# Patient Record
Sex: FEMALE | Race: ASIAN | NOT HISPANIC OR LATINO
[De-identification: names, ages, dates, MRNs, and addresses within clinical notes are randomized per-mention and may not be internally consistent; named-entity substitution may affect disease eponyms.]

---

## 2022-01-17 ENCOUNTER — TRANSCRIPTION ENCOUNTER (OUTPATIENT)
Age: 27
End: 2022-01-17

## 2022-10-31 PROBLEM — Z00.00 ENCOUNTER FOR PREVENTIVE HEALTH EXAMINATION: Status: ACTIVE | Noted: 2022-10-31

## 2022-11-03 ENCOUNTER — APPOINTMENT (OUTPATIENT)
Dept: CT IMAGING | Facility: CLINIC | Age: 27
End: 2022-11-03

## 2022-11-15 ENCOUNTER — APPOINTMENT (OUTPATIENT)
Dept: CT IMAGING | Facility: IMAGING CENTER | Age: 27
End: 2022-11-15

## 2023-12-12 ENCOUNTER — INPATIENT (INPATIENT)
Facility: HOSPITAL | Age: 28
LOS: 2 days | Discharge: HOME | End: 2023-12-15
Attending: OBSTETRICS & GYNECOLOGY | Admitting: OBSTETRICS & GYNECOLOGY

## 2023-12-12 VITALS
DIASTOLIC BLOOD PRESSURE: 75 MMHG | RESPIRATION RATE: 16 BRPM | HEART RATE: 91 BPM | SYSTOLIC BLOOD PRESSURE: 138 MMHG | TEMPERATURE: 98 F

## 2023-12-12 DIAGNOSIS — O26.899 OTHER SPECIFIED PREGNANCY RELATED CONDITIONS, UNSPECIFIED TRIMESTER: ICD-10-CM

## 2023-12-12 DIAGNOSIS — O42.90 PREMATURE RUPTURE OF MEMBRANES, UNSPECIFIED AS TO LENGTH OF TIME BETWEEN RUPTURE AND ONSET OF LABOR, UNSPECIFIED WEEKS OF GESTATION: ICD-10-CM

## 2023-12-12 LAB
ALBUMIN SERPL ELPH-MCNC: 3.5 G/DL — SIGNIFICANT CHANGE UP (ref 3.3–5)
ALBUMIN SERPL ELPH-MCNC: 3.5 G/DL — SIGNIFICANT CHANGE UP (ref 3.3–5)
ALP SERPL-CCNC: 119 U/L — SIGNIFICANT CHANGE UP (ref 40–120)
ALP SERPL-CCNC: 119 U/L — SIGNIFICANT CHANGE UP (ref 40–120)
ALT FLD-CCNC: 14 U/L — SIGNIFICANT CHANGE UP (ref 4–33)
ALT FLD-CCNC: 14 U/L — SIGNIFICANT CHANGE UP (ref 4–33)
ANION GAP SERPL CALC-SCNC: 11 MMOL/L — SIGNIFICANT CHANGE UP (ref 7–14)
ANION GAP SERPL CALC-SCNC: 11 MMOL/L — SIGNIFICANT CHANGE UP (ref 7–14)
APPEARANCE UR: CLEAR — SIGNIFICANT CHANGE UP
APPEARANCE UR: CLEAR — SIGNIFICANT CHANGE UP
APTT BLD: 26.1 SEC — SIGNIFICANT CHANGE UP (ref 24.5–35.6)
APTT BLD: 26.1 SEC — SIGNIFICANT CHANGE UP (ref 24.5–35.6)
AST SERPL-CCNC: 16 U/L — SIGNIFICANT CHANGE UP (ref 4–32)
AST SERPL-CCNC: 16 U/L — SIGNIFICANT CHANGE UP (ref 4–32)
BACTERIA # UR AUTO: NEGATIVE /HPF — SIGNIFICANT CHANGE UP
BACTERIA # UR AUTO: NEGATIVE /HPF — SIGNIFICANT CHANGE UP
BASOPHILS # BLD AUTO: 0.03 K/UL — SIGNIFICANT CHANGE UP (ref 0–0.2)
BASOPHILS # BLD AUTO: 0.03 K/UL — SIGNIFICANT CHANGE UP (ref 0–0.2)
BASOPHILS NFR BLD AUTO: 0.2 % — SIGNIFICANT CHANGE UP (ref 0–2)
BASOPHILS NFR BLD AUTO: 0.2 % — SIGNIFICANT CHANGE UP (ref 0–2)
BILIRUB SERPL-MCNC: 0.3 MG/DL — SIGNIFICANT CHANGE UP (ref 0.2–1.2)
BILIRUB SERPL-MCNC: 0.3 MG/DL — SIGNIFICANT CHANGE UP (ref 0.2–1.2)
BILIRUB UR-MCNC: NEGATIVE — SIGNIFICANT CHANGE UP
BILIRUB UR-MCNC: NEGATIVE — SIGNIFICANT CHANGE UP
BLD GP AB SCN SERPL QL: NEGATIVE — SIGNIFICANT CHANGE UP
BLD GP AB SCN SERPL QL: NEGATIVE — SIGNIFICANT CHANGE UP
BUN SERPL-MCNC: 10 MG/DL — SIGNIFICANT CHANGE UP (ref 7–23)
BUN SERPL-MCNC: 10 MG/DL — SIGNIFICANT CHANGE UP (ref 7–23)
CALCIUM SERPL-MCNC: 9.6 MG/DL — SIGNIFICANT CHANGE UP (ref 8.4–10.5)
CALCIUM SERPL-MCNC: 9.6 MG/DL — SIGNIFICANT CHANGE UP (ref 8.4–10.5)
CAST: 1 /LPF — SIGNIFICANT CHANGE UP (ref 0–4)
CAST: 1 /LPF — SIGNIFICANT CHANGE UP (ref 0–4)
CHLORIDE SERPL-SCNC: 104 MMOL/L — SIGNIFICANT CHANGE UP (ref 98–107)
CHLORIDE SERPL-SCNC: 104 MMOL/L — SIGNIFICANT CHANGE UP (ref 98–107)
CO2 SERPL-SCNC: 24 MMOL/L — SIGNIFICANT CHANGE UP (ref 22–31)
CO2 SERPL-SCNC: 24 MMOL/L — SIGNIFICANT CHANGE UP (ref 22–31)
COLOR SPEC: YELLOW — SIGNIFICANT CHANGE UP
COLOR SPEC: YELLOW — SIGNIFICANT CHANGE UP
CREAT ?TM UR-MCNC: 18 MG/DL — SIGNIFICANT CHANGE UP
CREAT ?TM UR-MCNC: 18 MG/DL — SIGNIFICANT CHANGE UP
CREAT SERPL-MCNC: 0.35 MG/DL — LOW (ref 0.5–1.3)
CREAT SERPL-MCNC: 0.35 MG/DL — LOW (ref 0.5–1.3)
DIFF PNL FLD: ABNORMAL
DIFF PNL FLD: ABNORMAL
EGFR: 144 ML/MIN/1.73M2 — SIGNIFICANT CHANGE UP
EGFR: 144 ML/MIN/1.73M2 — SIGNIFICANT CHANGE UP
EOSINOPHIL # BLD AUTO: 0.21 K/UL — SIGNIFICANT CHANGE UP (ref 0–0.5)
EOSINOPHIL # BLD AUTO: 0.21 K/UL — SIGNIFICANT CHANGE UP (ref 0–0.5)
EOSINOPHIL NFR BLD AUTO: 1.6 % — SIGNIFICANT CHANGE UP (ref 0–6)
EOSINOPHIL NFR BLD AUTO: 1.6 % — SIGNIFICANT CHANGE UP (ref 0–6)
FIBRINOGEN PPP-MCNC: 563 MG/DL — HIGH (ref 200–465)
FIBRINOGEN PPP-MCNC: 563 MG/DL — HIGH (ref 200–465)
GLUCOSE SERPL-MCNC: 85 MG/DL — SIGNIFICANT CHANGE UP (ref 70–99)
GLUCOSE SERPL-MCNC: 85 MG/DL — SIGNIFICANT CHANGE UP (ref 70–99)
GLUCOSE UR QL: NEGATIVE MG/DL — SIGNIFICANT CHANGE UP
GLUCOSE UR QL: NEGATIVE MG/DL — SIGNIFICANT CHANGE UP
HCT VFR BLD CALC: 35 % — SIGNIFICANT CHANGE UP (ref 34.5–45)
HCT VFR BLD CALC: 35 % — SIGNIFICANT CHANGE UP (ref 34.5–45)
HGB BLD-MCNC: 11.7 G/DL — SIGNIFICANT CHANGE UP (ref 11.5–15.5)
HGB BLD-MCNC: 11.7 G/DL — SIGNIFICANT CHANGE UP (ref 11.5–15.5)
HIV 1+2 AB+HIV1 P24 AG SERPL QL IA: SIGNIFICANT CHANGE UP
HIV 1+2 AB+HIV1 P24 AG SERPL QL IA: SIGNIFICANT CHANGE UP
IANC: 9.45 K/UL — HIGH (ref 1.8–7.4)
IANC: 9.45 K/UL — HIGH (ref 1.8–7.4)
IMM GRANULOCYTES NFR BLD AUTO: 1.2 % — HIGH (ref 0–0.9)
IMM GRANULOCYTES NFR BLD AUTO: 1.2 % — HIGH (ref 0–0.9)
INR BLD: 0.94 RATIO — SIGNIFICANT CHANGE UP (ref 0.85–1.18)
INR BLD: 0.94 RATIO — SIGNIFICANT CHANGE UP (ref 0.85–1.18)
KETONES UR-MCNC: NEGATIVE MG/DL — SIGNIFICANT CHANGE UP
KETONES UR-MCNC: NEGATIVE MG/DL — SIGNIFICANT CHANGE UP
LDH SERPL L TO P-CCNC: 156 U/L — SIGNIFICANT CHANGE UP (ref 135–225)
LDH SERPL L TO P-CCNC: 156 U/L — SIGNIFICANT CHANGE UP (ref 135–225)
LEUKOCYTE ESTERASE UR-ACNC: ABNORMAL
LEUKOCYTE ESTERASE UR-ACNC: ABNORMAL
LYMPHOCYTES # BLD AUTO: 17.9 % — SIGNIFICANT CHANGE UP (ref 13–44)
LYMPHOCYTES # BLD AUTO: 17.9 % — SIGNIFICANT CHANGE UP (ref 13–44)
LYMPHOCYTES # BLD AUTO: 2.32 K/UL — SIGNIFICANT CHANGE UP (ref 1–3.3)
LYMPHOCYTES # BLD AUTO: 2.32 K/UL — SIGNIFICANT CHANGE UP (ref 1–3.3)
MCHC RBC-ENTMCNC: 28.7 PG — SIGNIFICANT CHANGE UP (ref 27–34)
MCHC RBC-ENTMCNC: 28.7 PG — SIGNIFICANT CHANGE UP (ref 27–34)
MCHC RBC-ENTMCNC: 33.4 GM/DL — SIGNIFICANT CHANGE UP (ref 32–36)
MCHC RBC-ENTMCNC: 33.4 GM/DL — SIGNIFICANT CHANGE UP (ref 32–36)
MCV RBC AUTO: 85.8 FL — SIGNIFICANT CHANGE UP (ref 80–100)
MCV RBC AUTO: 85.8 FL — SIGNIFICANT CHANGE UP (ref 80–100)
MONOCYTES # BLD AUTO: 0.83 K/UL — SIGNIFICANT CHANGE UP (ref 0–0.9)
MONOCYTES # BLD AUTO: 0.83 K/UL — SIGNIFICANT CHANGE UP (ref 0–0.9)
MONOCYTES NFR BLD AUTO: 6.4 % — SIGNIFICANT CHANGE UP (ref 2–14)
MONOCYTES NFR BLD AUTO: 6.4 % — SIGNIFICANT CHANGE UP (ref 2–14)
NEUTROPHILS # BLD AUTO: 9.45 K/UL — HIGH (ref 1.8–7.4)
NEUTROPHILS # BLD AUTO: 9.45 K/UL — HIGH (ref 1.8–7.4)
NEUTROPHILS NFR BLD AUTO: 72.7 % — SIGNIFICANT CHANGE UP (ref 43–77)
NEUTROPHILS NFR BLD AUTO: 72.7 % — SIGNIFICANT CHANGE UP (ref 43–77)
NITRITE UR-MCNC: NEGATIVE — SIGNIFICANT CHANGE UP
NITRITE UR-MCNC: NEGATIVE — SIGNIFICANT CHANGE UP
NRBC # BLD: 0 /100 WBCS — SIGNIFICANT CHANGE UP (ref 0–0)
NRBC # BLD: 0 /100 WBCS — SIGNIFICANT CHANGE UP (ref 0–0)
NRBC # FLD: 0 K/UL — SIGNIFICANT CHANGE UP (ref 0–0)
NRBC # FLD: 0 K/UL — SIGNIFICANT CHANGE UP (ref 0–0)
PH UR: 7 — SIGNIFICANT CHANGE UP (ref 5–8)
PH UR: 7 — SIGNIFICANT CHANGE UP (ref 5–8)
PLATELET # BLD AUTO: 142 K/UL — LOW (ref 150–400)
PLATELET # BLD AUTO: 142 K/UL — LOW (ref 150–400)
POTASSIUM SERPL-MCNC: 3.8 MMOL/L — SIGNIFICANT CHANGE UP (ref 3.5–5.3)
POTASSIUM SERPL-MCNC: 3.8 MMOL/L — SIGNIFICANT CHANGE UP (ref 3.5–5.3)
POTASSIUM SERPL-SCNC: 3.8 MMOL/L — SIGNIFICANT CHANGE UP (ref 3.5–5.3)
POTASSIUM SERPL-SCNC: 3.8 MMOL/L — SIGNIFICANT CHANGE UP (ref 3.5–5.3)
PROT ?TM UR-MCNC: 9 MG/DL — SIGNIFICANT CHANGE UP
PROT ?TM UR-MCNC: 9 MG/DL — SIGNIFICANT CHANGE UP
PROT SERPL-MCNC: 6.6 G/DL — SIGNIFICANT CHANGE UP (ref 6–8.3)
PROT SERPL-MCNC: 6.6 G/DL — SIGNIFICANT CHANGE UP (ref 6–8.3)
PROT UR-MCNC: NEGATIVE MG/DL — SIGNIFICANT CHANGE UP
PROT UR-MCNC: NEGATIVE MG/DL — SIGNIFICANT CHANGE UP
PROT/CREAT UR-RTO: 0.5 RATIO — HIGH (ref 0–0.2)
PROT/CREAT UR-RTO: 0.5 RATIO — HIGH (ref 0–0.2)
PROTHROM AB SERPL-ACNC: 10.6 SEC — SIGNIFICANT CHANGE UP (ref 9.5–13)
PROTHROM AB SERPL-ACNC: 10.6 SEC — SIGNIFICANT CHANGE UP (ref 9.5–13)
RBC # BLD: 4.08 M/UL — SIGNIFICANT CHANGE UP (ref 3.8–5.2)
RBC # BLD: 4.08 M/UL — SIGNIFICANT CHANGE UP (ref 3.8–5.2)
RBC # FLD: 15.9 % — HIGH (ref 10.3–14.5)
RBC # FLD: 15.9 % — HIGH (ref 10.3–14.5)
RBC CASTS # UR COMP ASSIST: 6 /HPF — HIGH (ref 0–4)
RBC CASTS # UR COMP ASSIST: 6 /HPF — HIGH (ref 0–4)
RH IG SCN BLD-IMP: POSITIVE — SIGNIFICANT CHANGE UP
SODIUM SERPL-SCNC: 139 MMOL/L — SIGNIFICANT CHANGE UP (ref 135–145)
SODIUM SERPL-SCNC: 139 MMOL/L — SIGNIFICANT CHANGE UP (ref 135–145)
SP GR SPEC: 1.01 — SIGNIFICANT CHANGE UP (ref 1–1.03)
SP GR SPEC: 1.01 — SIGNIFICANT CHANGE UP (ref 1–1.03)
SQUAMOUS # UR AUTO: 4 /HPF — SIGNIFICANT CHANGE UP (ref 0–5)
SQUAMOUS # UR AUTO: 4 /HPF — SIGNIFICANT CHANGE UP (ref 0–5)
URATE SERPL-MCNC: 5 MG/DL — SIGNIFICANT CHANGE UP (ref 2.5–7)
URATE SERPL-MCNC: 5 MG/DL — SIGNIFICANT CHANGE UP (ref 2.5–7)
UROBILINOGEN FLD QL: 0.2 MG/DL — SIGNIFICANT CHANGE UP (ref 0.2–1)
UROBILINOGEN FLD QL: 0.2 MG/DL — SIGNIFICANT CHANGE UP (ref 0.2–1)
WBC # BLD: 12.99 K/UL — HIGH (ref 3.8–10.5)
WBC # BLD: 12.99 K/UL — HIGH (ref 3.8–10.5)
WBC # FLD AUTO: 12.99 K/UL — HIGH (ref 3.8–10.5)
WBC # FLD AUTO: 12.99 K/UL — HIGH (ref 3.8–10.5)
WBC UR QL: 18 /HPF — HIGH (ref 0–5)
WBC UR QL: 18 /HPF — HIGH (ref 0–5)

## 2023-12-12 RX ORDER — AMPICILLIN TRIHYDRATE 250 MG
2 CAPSULE ORAL ONCE
Refills: 0 | Status: COMPLETED | OUTPATIENT
Start: 2023-12-12 | End: 2023-12-12

## 2023-12-12 RX ORDER — SODIUM CHLORIDE 9 MG/ML
1000 INJECTION, SOLUTION INTRAVENOUS
Refills: 0 | Status: DISCONTINUED | OUTPATIENT
Start: 2023-12-12 | End: 2023-12-13

## 2023-12-12 RX ORDER — AMPICILLIN TRIHYDRATE 250 MG
1 CAPSULE ORAL EVERY 4 HOURS
Refills: 0 | Status: DISCONTINUED | OUTPATIENT
Start: 2023-12-12 | End: 2023-12-13

## 2023-12-12 RX ORDER — OXYTOCIN 10 UNIT/ML
333.33 VIAL (ML) INJECTION
Qty: 20 | Refills: 0 | Status: DISCONTINUED | OUTPATIENT
Start: 2023-12-12 | End: 2023-12-13

## 2023-12-12 RX ORDER — CHLORHEXIDINE GLUCONATE 213 G/1000ML
1 SOLUTION TOPICAL DAILY
Refills: 0 | Status: DISCONTINUED | OUTPATIENT
Start: 2023-12-12 | End: 2023-12-13

## 2023-12-12 RX ADMIN — Medication 200 GRAM(S): at 23:22

## 2023-12-12 RX ADMIN — SODIUM CHLORIDE 125 MILLILITER(S): 9 INJECTION, SOLUTION INTRAVENOUS at 23:22

## 2023-12-12 NOTE — OB PROVIDER H&P - NSHPPHYSICALEXAM_GEN_ALL_CORE
T(C): 36.9 (12-12-23 @ 20:48), Max: 36.9 (12-12-23 @ 18:44)  HR: 80 (12-12-23 @ 22:07) (77 - 91)  BP: 113/60 (12-12-23 @ 22:07) (113/60 - 152/85)  RR: 16 (12-12-23 @ 18:44) (16 - 16)    Heart: RRR  Lungs: CTA  Abdomen: Gravid, soft, NT    NST: Reactive with moderate variability, Category 1 tracing  [place]: Irregular contractions  SSE: +pooling, nitrazine positive, fern positive  VE: 2.5/60/-3, ruptured  TAS: Cephalic presentation T(C): 36.9 (12-12-23 @ 20:48), Max: 36.9 (12-12-23 @ 18:44)  HR: 80 (12-12-23 @ 22:07) (77 - 91)  BP: 113/60 (12-12-23 @ 22:07) (113/60 - 152/85)  RR: 16 (12-12-23 @ 18:44) (16 - 16)    Heart: RRR  Lungs: CTA  Abdomen: Gravid, soft, NT    NST: Reactive with moderate variability, Category 1 tracing  [place]: Irregular contractions  SSE: +pooling, nitrazine positive, fern positive  VE: 2.5/60/-3, ruptured  TAS: Cephalic presentation

## 2023-12-12 NOTE — OB PROVIDER H&P - NSLOWPPHRISK_OBGYN_A_OB
No previous uterine incision/[facility] Pregnancy/Less than or equal to 4 previous vaginal births/No known bleeding disorder/No history of postpartum hemorrhage/No other PPH risks indicated

## 2023-12-12 NOTE — OB PROVIDER H&P - NS_EFFACEMANT_OBGYN_ALL_OB_NU
Eucrisa Counseling: Patient may experience a mild burning sensation during topical application. Eucrisa is not approved in children less than 2 years of age. 61

## 2023-12-12 NOTE — OB RN TRIAGE NOTE - FALL HARM RISK - UNIVERSAL INTERVENTIONS
Bed in lowest position, wheels locked, appropriate side rails in place/Call bell, personal items and telephone in reach/Instruct patient to call for assistance before getting out of bed or chair/Non-slip footwear when patient is out of bed/[place] to call system/Physically safe environment - no spills, clutter or unnecessary equipment/Purposeful Proactive Rounding/Room/bathroom lighting operational, light cord in reach Bed in lowest position, wheels locked, appropriate side rails in place/Call bell, personal items and telephone in reach/Instruct patient to call for assistance before getting out of bed or chair/Non-slip footwear when patient is out of bed/[place] to call system/Physically safe environment - no spills, clutter or unnecessary equipment/Purposeful Proactive Rounding/Room/bathroom lighting operational, light cord in reach

## 2023-12-12 NOTE — OB PROVIDER H&P - ASSESSMENT
28y  at 40w1d with PROM  Pt found to have elevated BP in triage, denies any symptoms: PEC labs sent  GBS: Unknown  D/w Dr Crouch and Dr Mcgrath  -Admit to labor and delivery for induction of labor  -Pain Management prn  -Cont EFM/[place]  -Admission labs: CBC, RPR, T&S and PEC labs sent  -IV hydration  -Clear liquid diet  -Pitocin for IOL 28y  at 40w1d with PROM  Pt found to have elevated BP in triage, denies any symptoms: PEC labs sent  GBS: Unknown  D/w Dr Crouch and Dr Mcgrath  -Admit to labor and delivery for induction of labor  -Pain Management prn  -Cont EFM/[place]  -Admission labs: CBC, RPR, T&S and PEC labs sent  -IV hydration  -Clear liquid diet  -Pitocin for IOL

## 2023-12-12 NOTE — OB PROVIDER H&P - NSHPLABSRESULTS_GEN_ALL_CORE
Admission labs and PEC labs sent      LABS                        11.7   12.99 )-----------( 142      ( 12 Dec 2023 21:50 )             35.0

## 2023-12-12 NOTE — OB PROVIDER H&P - PROBLEM SELECTOR PLAN 1
-Admit to labor and delivery for induction of labor  -Pain Management prn  -Cont EFM/[place]  -Admission labs: CBC, RPR, T&S and PEC labs sent  -IV hydration  -Clear liquid diet  -Pitocin for IOL -Admit to labor and delivery for induction of labor  -Pain Management prn  -Cont EFM/[place]  -Admission labs: CBC, RPR, T&S and PEC labs sent  -IV hydration  -Clear liquid diet  -Pitocin for IOL

## 2023-12-12 NOTE — OB PROVIDER H&P - HISTORY OF PRESENT ILLNESS
28y  at 40w1d sent to triage from OB for evaluation due to decels on NST and also pt c/o LOF since 23 at 5pm. Pt also c/o feeling some contractions. Reports +FM, no vaginal bleeding  Prenatal care: [facility], denies any prenatal complications  GBS: Unknown

## 2023-12-13 DIAGNOSIS — O42.10 PREMATURE RUPTURE OF MEMBRANES, ONSET OF LABOR MORE THAN 24 HOURS FOLLOWING RUPTURE, UNSPECIFIED WEEKS OF GESTATION: ICD-10-CM

## 2023-12-13 LAB
HBV SURFACE AG SER-ACNC: SIGNIFICANT CHANGE UP
HBV SURFACE AG SER-ACNC: SIGNIFICANT CHANGE UP
RUBV IGG SER-ACNC: 13.8 INDEX — SIGNIFICANT CHANGE UP
RUBV IGG SER-ACNC: 13.8 INDEX — SIGNIFICANT CHANGE UP
RUBV IGG SER-IMP: POSITIVE — SIGNIFICANT CHANGE UP
RUBV IGG SER-IMP: POSITIVE — SIGNIFICANT CHANGE UP
T PALLIDUM AB TITR SER: NEGATIVE — SIGNIFICANT CHANGE UP
T PALLIDUM AB TITR SER: NEGATIVE — SIGNIFICANT CHANGE UP

## 2023-12-13 RX ORDER — SIMETHICONE 80 MG/1
80 TABLET, CHEWABLE ORAL EVERY 4 HOURS
Refills: 0 | Status: DISCONTINUED | OUTPATIENT
Start: 2023-12-13 | End: 2023-12-15

## 2023-12-13 RX ORDER — TETANUS TOXOID, REDUCED DIPHTHERIA TOXOID AND ACELLULAR PERTUSSIS VACCINE, ADSORBED 5; 2.5; 8; 8; 2.5 [IU]/.5ML; [IU]/.5ML; UG/.5ML; UG/.5ML; UG/.5ML
0.5 SUSPENSION INTRAMUSCULAR ONCE
Refills: 0 | Status: COMPLETED | OUTPATIENT
Start: 2023-12-13

## 2023-12-13 RX ORDER — DIPHENHYDRAMINE HCL 50 MG
25 CAPSULE ORAL EVERY 6 HOURS
Refills: 0 | Status: DISCONTINUED | OUTPATIENT
Start: 2023-12-13 | End: 2023-12-15

## 2023-12-13 RX ORDER — BENZOCAINE 10 %
1 GEL (GRAM) MUCOUS MEMBRANE EVERY 6 HOURS
Refills: 0 | Status: DISCONTINUED | OUTPATIENT
Start: 2023-12-13 | End: 2023-12-15

## 2023-12-13 RX ORDER — LANOLIN
1 OINTMENT (GRAM) TOPICAL EVERY 6 HOURS
Refills: 0 | Status: DISCONTINUED | OUTPATIENT
Start: 2023-12-13 | End: 2023-12-15

## 2023-12-13 RX ORDER — SODIUM CHLORIDE 9 MG/ML
3 INJECTION INTRAMUSCULAR; INTRAVENOUS; SUBCUTANEOUS EVERY 8 HOURS
Refills: 0 | Status: DISCONTINUED | OUTPATIENT
Start: 2023-12-13 | End: 2023-12-15

## 2023-12-13 RX ORDER — AER TRAVELER 0.5 G/1
1 SOLUTION RECTAL; TOPICAL EVERY 4 HOURS
Refills: 0 | Status: DISCONTINUED | OUTPATIENT
Start: 2023-12-13 | End: 2023-12-15

## 2023-12-13 RX ORDER — DIBUCAINE 1 %
1 OINTMENT (GRAM) RECTAL EVERY 6 HOURS
Refills: 0 | Status: DISCONTINUED | OUTPATIENT
Start: 2023-12-13 | End: 2023-12-15

## 2023-12-13 RX ORDER — OXYCODONE HYDROCHLORIDE 5 MG/1
5 TABLET ORAL ONCE
Refills: 0 | Status: DISCONTINUED | OUTPATIENT
Start: 2023-12-13 | End: 2023-12-15

## 2023-12-13 RX ORDER — KETOROLAC TROMETHAMINE 30 MG/ML
30 SYRINGE (ML) INJECTION ONCE
Refills: 0 | Status: DISCONTINUED | OUTPATIENT
Start: 2023-12-13 | End: 2023-12-13

## 2023-12-13 RX ORDER — IBUPROFEN 200 MG
600 TABLET ORAL EVERY 6 HOURS
Refills: 0 | Status: COMPLETED | OUTPATIENT
Start: 2023-12-13 | End: 2024-11-10

## 2023-12-13 RX ORDER — HYDROCORTISONE 1 %
1 OINTMENT (GRAM) TOPICAL EVERY 6 HOURS
Refills: 0 | Status: DISCONTINUED | OUTPATIENT
Start: 2023-12-13 | End: 2023-12-15

## 2023-12-13 RX ORDER — OXYCODONE HYDROCHLORIDE 5 MG/1
5 TABLET ORAL
Refills: 0 | Status: DISCONTINUED | OUTPATIENT
Start: 2023-12-13 | End: 2023-12-15

## 2023-12-13 RX ORDER — OXYTOCIN 10 UNIT/ML
2 VIAL (ML) INJECTION
Qty: 30 | Refills: 0 | Status: DISCONTINUED | OUTPATIENT
Start: 2023-12-13 | End: 2023-12-13

## 2023-12-13 RX ORDER — PRAMOXINE HYDROCHLORIDE 150 MG/15G
1 AEROSOL, FOAM RECTAL EVERY 4 HOURS
Refills: 0 | Status: DISCONTINUED | OUTPATIENT
Start: 2023-12-13 | End: 2023-12-15

## 2023-12-13 RX ORDER — OXYTOCIN 10 UNIT/ML
41.67 VIAL (ML) INJECTION
Qty: 20 | Refills: 0 | Status: DISCONTINUED | OUTPATIENT
Start: 2023-12-13 | End: 2023-12-13

## 2023-12-13 RX ORDER — IBUPROFEN 200 MG
600 TABLET ORAL EVERY 6 HOURS
Refills: 0 | Status: DISCONTINUED | OUTPATIENT
Start: 2023-12-13 | End: 2023-12-14

## 2023-12-13 RX ORDER — MAGNESIUM HYDROXIDE 400 MG/1
30 TABLET, CHEWABLE ORAL
Refills: 0 | Status: DISCONTINUED | OUTPATIENT
Start: 2023-12-13 | End: 2023-12-15

## 2023-12-13 RX ORDER — ACETAMINOPHEN 500 MG
975 TABLET ORAL
Refills: 0 | Status: DISCONTINUED | OUTPATIENT
Start: 2023-12-13 | End: 2023-12-15

## 2023-12-13 RX ADMIN — Medication 2 MILLIUNIT(S)/MIN: at 00:54

## 2023-12-13 RX ADMIN — Medication 600 MILLIGRAM(S): at 18:06

## 2023-12-13 RX ADMIN — SODIUM CHLORIDE 3 MILLILITER(S): 9 INJECTION INTRAMUSCULAR; INTRAVENOUS; SUBCUTANEOUS at 22:39

## 2023-12-13 RX ADMIN — SODIUM CHLORIDE 3 MILLILITER(S): 9 INJECTION INTRAMUSCULAR; INTRAVENOUS; SUBCUTANEOUS at 14:00

## 2023-12-13 RX ADMIN — Medication 975 MILLIGRAM(S): at 13:38

## 2023-12-13 RX ADMIN — Medication 125 MILLIUNIT(S)/MIN: at 03:08

## 2023-12-13 RX ADMIN — Medication 600 MILLIGRAM(S): at 18:45

## 2023-12-13 RX ADMIN — Medication 975 MILLIGRAM(S): at 14:38

## 2023-12-13 RX ADMIN — Medication 30 MILLIGRAM(S): at 03:53

## 2023-12-13 RX ADMIN — Medication 30 MILLIGRAM(S): at 04:38

## 2023-12-13 NOTE — CHART NOTE - NSCHARTNOTEFT_GEN_A_CORE
RN called re patient reporting leg pain.    Patient seen and assessed at bedside. Patient reports lower abdominal pain described as cramping on and off, pain 7/10. Reports intermittent leg pain 5/10. Reports back pain at epidural site 9/10 which is also intermittent.     Vital Signs Last 24 Hrs  T(C): 37.6 (13 Dec 2023 14:19), Max: 37.6 (13 Dec 2023 14:19)  T(F): 99.7 (13 Dec 2023 14:19), Max: 99.7 (13 Dec 2023 14:19)  HR: 85 (13 Dec 2023 14:19) (66 - 136)  BP: 135/88 (13 Dec 2023 14:19) (92/53 - 165/114)  BP(mean): --  RR: 18 (13 Dec 2023 14:19) (16 - 19)  SpO2: 100% (13 Dec 2023 14:19) (87% - 100%)    Parameters below as of 13 Dec 2023 14:19  Patient On (Oxygen Delivery Method): room air    PE:   - Extremities: nonedematous, nonerythematous, non tender to palpation   - Back: Only tender over epidural site  - GYN: lochia wnl, no fundal tenderness    A&P:  Pt is a 27y/o  PPD0 from [facility] with leg, abdominal and back pain  - Abdominal pain consistent with uterine contractions within normal limits after delivery. Advised to continue Motrin and Tylenol RTC, use heating pad  - Leg pain is intermittent, no swelling, erythema or pain to palpation. Most likely second to positioning during delivery  - Back pain is consistent with residual tenderness at epidural insertion spot. Continue to monitor.   - Recommended Oxycodone for increased pain control if Motrin, Tylenol and hot packs do not help.     Carlee Mckeon, PGY1  d/w Dr. Canela RN called re patient reporting leg pain.    Patient seen and assessed at bedside. Patient reports lower abdominal pain described as cramping on and off, pain 7/10. Reports intermittent leg pain 5/10. Reports back pain at epidural site 9/10 which is also intermittent.     Vital Signs Last 24 Hrs  T(C): 37.6 (13 Dec 2023 14:19), Max: 37.6 (13 Dec 2023 14:19)  T(F): 99.7 (13 Dec 2023 14:19), Max: 99.7 (13 Dec 2023 14:19)  HR: 85 (13 Dec 2023 14:19) (66 - 136)  BP: 135/88 (13 Dec 2023 14:19) (92/53 - 165/114)  BP(mean): --  RR: 18 (13 Dec 2023 14:19) (16 - 19)  SpO2: 100% (13 Dec 2023 14:19) (87% - 100%)    Parameters below as of 13 Dec 2023 14:19  Patient On (Oxygen Delivery Method): room air    PE:   - Extremities: nonedematous, nonerythematous, non tender to palpation   - Back: Only tender over epidural site  - GYN: lochia wnl, no fundal tenderness    A&P:  Pt is a 29y/o  PPD0 from [facility] with leg, abdominal and back pain  - Abdominal pain consistent with uterine contractions within normal limits after delivery. Advised to continue Motrin and Tylenol RTC, use heating pad  - Leg pain is intermittent, no swelling, erythema or pain to palpation. Most likely second to positioning during delivery  - Back pain is consistent with residual tenderness at epidural insertion spot. Continue to monitor.   - Recommended Oxycodone for increased pain control if Motrin, Tylenol and hot packs do not help.     Carlee Mckeon, PGY1  d/w Dr. Canela

## 2023-12-13 NOTE — OB PROVIDER DELIVERY SUMMARY - NSPROVIDERDELIVERYNOTE_OBGYN_ALL_OB_FT
Spontaneous vaginal delivery of liveborn infant from LOREE position. Head, shoulders, and body delivered easily. CANx3 reduced following delivery and true knot. Infant was suctioned. No mec. Delayed cord clamping. Infant handed to mom. Cord was clamped and cut. Placenta delivered intact with a 3 vessel cord. Fundal massage was given and uterine fundus was found to be firm. Vaginal exam revealed an intact cervix, vaginal walls and sulci. Patient had a 1st degree laceration in the perineum that was repaired with 2.0 chromic suture. Excellent hemostasis was noted. Patient was stable and went to recovery. Count was correct x 2.

## 2023-12-13 NOTE — OB RN PATIENT PROFILE - POST PARTUM DEPRESSION SCREEN OB 3
Spoke to patient    CONFIRMED procedure arrival time of 9am on 3/13/19 for DIAZ and 9am on 3/14/19 for PVI.  Reiterated instructions including:  -Directions to check in desk  -NPO after midnight night prior to procedures on 3/13/19 and 3/14/19  -High importance of HOLDING Eliquis on the morning of ablation ONLY (3/14/19). Advised to take both doses of Eliquis, as scheduled on 3/13/19.        Patient verbalizes understanding of above and appreciates call.    
patient denies
no

## 2023-12-13 NOTE — OB RN PATIENT PROFILE - FALL HARM RISK - UNIVERSAL INTERVENTIONS
Bed in lowest position, wheels locked, appropriate side rails in place/Call bell, personal items and telephone in reach/Instruct patient to call for assistance before getting out of bed or chair/Non-slip footwear when patient is out of bed/[place] to call system/Physically safe environment - no spills, clutter or unnecessary equipment/Purposeful Proactive Rounding/Room/bathroom lighting operational, light cord in reach Bed in lowest position, wheels locked, appropriate side rails in place/Call bell, personal items and telephone in reach/Instruct patient to call for assistance before getting out of bed or chair/Non-slip footwear when patient is out of bed/[place] to call system/Physically safe environment - no spills, clutter or unnecessary equipment/Purposeful Proactive Rounding/Room/bathroom lighting operational, light cord in reach

## 2023-12-13 NOTE — PROVIDER CONTACT NOTE (OTHER) - BACKGROUND
BRAYDEN 12/13/2023 @ 0207.  Parity 3003.  QBL 250mL.  S/p Rectal Cytotec x 1.  40.1 weeks gestation.  O+ bloodtype.

## 2023-12-13 NOTE — OB PROVIDER LABOR PROGRESS NOTE - ASSESSMENT
@40.2wks PROM IOL  Forebag ruptured- clear  Patient fully dilated with fetal head +1 station   Patient with urge to push   Anticipate   MD Mcgrath aware and at bedside for delivery     SAPPHIRE Hemphill NP

## 2023-12-13 NOTE — OB NEONATOLOGY/PEDIATRICIAN DELIVERY SUMMARY - NSPEDSNEONOTESA_OBGYN_ALL_OB_FT
Baby is a 40.1 wk GA female born to a 29 y/o  mother via . PEDS called to delivery for category II tracings. Maternal history significant for cystectomy in . Prenatal history uncomplicated. Maternal blood type O+. PNL unknown at time of delivery except HIV nonreactive. GBS, hep B, RPR, rubella labs sent. GBS unknown, mom received 1x Ampicillin 2g at 23:23 , 3 hours prior to delivery. Reported SROM 3-4 days ago, clear fluids. No maternal fever, highest temp 36.9 C. Baby born vigorous and crying spontaneously. Warmed, dried, stimulated. Apgars 9/9. EOS 0.11. Mom plans to breastfeed and not yet consents/declines hepB.     BW:  : 23  TOB: 2:07AM    Physical Exam:  Gen: NAD, +grimace  HEENT: anterior fontanel open soft and flat, no cleft lip/palate, ears normal set, no ear pits or tags. no lesions in mouth/throat, nares clinically patent  Resp: no increased work of breathing, good air entry b/l, clear to auscultation bilaterally  Cardio: Normal S1/S2, regular rate and rhythm, no murmurs  Abd: soft, non tender, non distended, + bowel sounds, umbilical cord with 3 vessels  Neuro: +grasp/suck/tai, normal tone  Extremities: negative fofana and ortolani, moving all extremities, full range of motion x 4, no crepitus  Skin: pink, warm  Genitals: normal female anatomy, Mp 1, anus patent

## 2023-12-13 NOTE — OB RN DELIVERY SUMMARY - NS_SEPSISRSKCALC_OBGYN_ALL_OB_FT
EOS calculated successfully. EOS Risk Factor: 0.5/1000 live births ([facility] national incidence); GA=40w2d; Temp=98.42; ROM=81.117; GBS='Unknown'; Antibiotics='GBS specific antibiotics > 2 hrs prior to birth'   EOS calculated successfully. EOS Risk Factor: 0.5/1000 live births ([facility] national incidence); GA=40w2d; Temp=98.42; ROM=81.117; GBS='Unknown'; Antibiotics='GBS specific antibiotics > 2 hrs prior to birth'

## 2023-12-13 NOTE — OB PROVIDER DELIVERY SUMMARY - NSSELHIDDEN_OBGYN_ALL_OB_FT
[NS_DeliveryAttending1_OBGYN_ALL_OB_FT:[id number]] [NS_DeliveryAttending1_OBGYN_ALL_OB_FT:[id number]] [NS_DeliveryAttending1_OBGYN_ALL_OB_FT:[id number]=],[NS_DeliveryAssist1_OBGYN_ALL_OB_FT:[id number]=],[NS_DeliveryAssist2_OBGYN_ALL_OB_FT:[id number]] [NS_DeliveryAttending1_OBGYN_ALL_OB_FT:[id number]=],[NS_DeliveryAssist1_OBGYN_ALL_OB_FT:[id number]=],[NS_DeliveryAssist2_OBGYN_ALL_OB_FT:[id number]=]

## 2023-12-13 NOTE — OB RN DELIVERY SUMMARY - NSSELHIDDEN_OBGYN_ALL_OB_FT
[NS_DeliveryAttending1_OBGYN_ALL_OB_FT:[id number]=],[NS_DeliveryAssist1_OBGYN_ALL_OB_FT:[id number]=],[NS_DeliveryAssist2_OBGYN_ALL_OB_FT:[id number]=] [NS_DeliveryAttending1_OBGYN_ALL_OB_FT:[id number]=],[NS_DeliveryAssist1_OBGYN_ALL_OB_FT:[id number]=],[NS_DeliveryAssist2_OBGYN_ALL_OB_FT:[id number]=] [NS_DeliveryAttending1_OBGYN_ALL_OB_FT:[id number]=],[NS_DeliveryAssist1_OBGYN_ALL_OB_FT:[id number]=],[NS_DeliveryAssist2_OBGYN_ALL_OB_FT:[id number]=],[NS_DeliveryRN_OBGYN_ALL_OB_FT:[id number]=] [NS_DeliveryAttending1_OBGYN_ALL_OB_FT:[id number]=],[NS_DeliveryAssist1_OBGYN_ALL_OB_FT:[id number]=],[NS_DeliveryAssist2_OBGYN_ALL_OB_FT:[id number]=],[NS_DeliveryRN_OBGYN_ALL_OB_FT:[id number]=]

## 2023-12-14 ENCOUNTER — TRANSCRIPTION ENCOUNTER (OUTPATIENT)
Age: 28
End: 2023-12-14

## 2023-12-14 DIAGNOSIS — O14.90 UNSPECIFIED PRE-ECLAMPSIA, UNSPECIFIED TRIMESTER: ICD-10-CM

## 2023-12-14 LAB
HCT VFR BLD CALC: 32.1 % — LOW (ref 34.5–45)
HCT VFR BLD CALC: 32.1 % — LOW (ref 34.5–45)
HGB BLD-MCNC: 10.7 G/DL — LOW (ref 11.5–15.5)
HGB BLD-MCNC: 10.7 G/DL — LOW (ref 11.5–15.5)

## 2023-12-14 RX ORDER — KETOROLAC TROMETHAMINE 30 MG/ML
10 SYRINGE (ML) INJECTION EVERY 6 HOURS
Refills: 0 | Status: DISCONTINUED | OUTPATIENT
Start: 2023-12-14 | End: 2023-12-15

## 2023-12-14 RX ORDER — TETANUS TOXOID, REDUCED DIPHTHERIA TOXOID AND ACELLULAR PERTUSSIS VACCINE, ADSORBED 5; 2.5; 8; 8; 2.5 [IU]/.5ML; [IU]/.5ML; UG/.5ML; UG/.5ML; UG/.5ML
0.5 SUSPENSION INTRAMUSCULAR ONCE
Refills: 0 | Status: COMPLETED | OUTPATIENT
Start: 2023-12-14 | End: 2023-12-14

## 2023-12-14 RX ORDER — IBUPROFEN 200 MG
1 TABLET ORAL
Qty: 30 | Refills: 0
Start: 2023-12-14

## 2023-12-14 RX ORDER — LANOLIN
1 OINTMENT (GRAM) TOPICAL
Qty: 0 | Refills: 0 | DISCHARGE
Start: 2023-12-14

## 2023-12-14 RX ORDER — ACETAMINOPHEN 500 MG
3 TABLET ORAL
Qty: 0 | Refills: 0 | DISCHARGE
Start: 2023-12-14

## 2023-12-14 RX ADMIN — Medication 10 MILLIGRAM(S): at 14:07

## 2023-12-14 RX ADMIN — TETANUS TOXOID, REDUCED DIPHTHERIA TOXOID AND ACELLULAR PERTUSSIS VACCINE, ADSORBED 0.5 MILLILITER(S): 5; 2.5; 8; 8; 2.5 SUSPENSION INTRAMUSCULAR at 05:35

## 2023-12-14 RX ADMIN — Medication 600 MILLIGRAM(S): at 05:35

## 2023-12-14 RX ADMIN — Medication 600 MILLIGRAM(S): at 06:19

## 2023-12-14 RX ADMIN — Medication 10 MILLIGRAM(S): at 14:57

## 2023-12-14 RX ADMIN — SODIUM CHLORIDE 3 MILLILITER(S): 9 INJECTION INTRAMUSCULAR; INTRAVENOUS; SUBCUTANEOUS at 06:19

## 2023-12-14 RX ADMIN — SODIUM CHLORIDE 3 MILLILITER(S): 9 INJECTION INTRAMUSCULAR; INTRAVENOUS; SUBCUTANEOUS at 22:05

## 2023-12-14 RX ADMIN — SODIUM CHLORIDE 3 MILLILITER(S): 9 INJECTION INTRAMUSCULAR; INTRAVENOUS; SUBCUTANEOUS at 18:26

## 2023-12-14 NOTE — CHART NOTE - NSCHARTNOTEFT_GEN_A_CORE
Patient seen and examined at bedside. Denies headache, changes in vision, RUQ or epigastric pain, new onset edema.    T(C): 36.9 (12-14-23 @ 10:00), Max: 37.6 (12-13-23 @ 14:19)  HR: 80 (12-14-23 @ 10:00) (76 - 88)  BP: 125/78 (12-14-23 @ 10:00) (119/76 - 136/75)  RR: 18 (12-14-23 @ 10:00) (17 - 18)  SpO2: 100% (12-14-23 @ 10:00) (97% - 100%)    CAPILLARY BLOOD GLUCOSE                 10.7   x     )-----------( x        ( 12-14 @ 06:23 )             32.1       Blood Type: O Positive    12-12 @ 21:50    139  |  104  |  10  ----------------------------<  85  3.8   |  24  |  0.35    Ca    9.6      12-12 @ 21:50    TPro  6.6  /  Alb  3.5  /  TBili  0.3  /  DBili  x   /  AST  16  /  ALT  14  /  AlkPhos  119  12-12 @ 21:50    PT/INR - ( 12-12 @ 21:50 )   PT: 10.6 sec;   INR: 0.94 ratio    PTT - ( 12-12 @ 21:50 )  PTT:26.1 sec    Uric Acid: (12-12 @ 21:50)  5.0      Fibrinogen: (12-12 @ 21:50)  --       LDH: (12-12 @ 21:50)  156            Physical Exam:  General: NAD  Neuro: +2/4 brachioradialis   CV: RRR  Pulm: CTAB  Ext: Mild edema      A/P: Pt meets criteria for PEC without severe features based on mild-range BP >4 hours apart and P/C 0.5  - HELLP labs wnl, P/C 0.5  - BP have been wnl overnight  - Denies severe features   - Continue to monitor BPs  - Discussed diagnosis with patient and risks including seizures and death    Carlee Mckeon, PGY1  d/w Dr. Canela and Dr. Murray

## 2023-12-14 NOTE — DISCHARGE NOTE OB - HOSPITAL COURSE
Patient was admitted to L+D for IOL for PROM at 40w1d, Pt had an  complicated by preeclampsia without severe features, diagnosed by mild-range BP and P/C 0.5. Patient's postpartum course was uncomplicated and patient's blood pressure was well controlled and did not require antihypertensive medications.  QBL: 250mL  Hct: 35.0->32.1  On Postpartum day 2, patient was discharged home in stable condition, voiding spontaneously, pain well controlled, ambulating, tolerating PO and with normal vital signs. Patient declines postpartum birth control for now, and will decide with Dr. Choi. Pt plans to follow up in the Dr. Choi's office within 1 week for a BP check and in 6 weeks for a postpartum visit. Telephone number and clinic information provided prior to discharge.

## 2023-12-14 NOTE — DISCHARGE NOTE OB - PROVIDER TOKENS
PROVIDER:[TOKEN:[[id number]],FOLLOWUP:[1 week],ESTABLISHEDPATIENT:[T]] PROVIDER:[TOKEN:[[id number]],FOLLOWUP:[1 week],ESTABLISHEDPATIENT:[T]]

## 2023-12-14 NOTE — PROGRESS NOTE ADULT - SUBJECTIVE AND OBJECTIVE BOX
OB Progress Note:  PPD#1    S: 27yo PPD#1 s/p . Patient feels well. Pain is well controlled. She is tolerating a regular diet and passing flatus. She is voiding spontaneously, and ambulating without difficulty. Endorses light vaginal bleeding, soaking less than 1 pad/hour. Denies CP/SOB. Denies lightheadedness/dizziness. Denies N/V.     O:  Vitals:  Vital Signs Last 24 Hrs  T(C): 36.7 (14 Dec 2023 01:06), Max: 37.6 (13 Dec 2023 14:19)  T(F): 98 (14 Dec 2023 01:06), Max: 99.7 (13 Dec 2023 14:19)  HR: 88 (14 Dec 2023 01:06) (76 - 88)  BP: 136/75 (14 Dec 2023 01:06) (120/67 - 136/75)  BP(mean): --  RR: 18 (14 Dec 2023 01:06) (17 - 18)  SpO2: 99% (14 Dec 2023 01:06) (97% - 100%)    Parameters below as of 14 Dec 2023 01:06  Patient On (Oxygen Delivery Method): room air        MEDICATIONS  (STANDING):  acetaminophen     Tablet .. 975 milliGRAM(s) Oral <User Schedule>  ibuprofen  Tablet. 600 milliGRAM(s) Oral every 6 hours  prenatal multivitamin 1 Tablet(s) Oral daily  sodium chloride 0.9% lock flush 3 milliLiter(s) IV Push every 8 hours      Labs:  Blood type: O Positive  Rubella IgG: RPR: Negative                          11.7   12.99<H> >-----------< 142<L>    (  @ 21:50 )             35.0    23 @ 21:50      139  |  104  |  10  ----------------------------<  85  3.8   |  24  |  0.35<L>        Ca    9.6      12 Dec 2023 21:50    TPro  6.6  /  Alb  3.5  /  TBili  0.3  /  DBili  x   /  AST  16  /  ALT  14  /  AlkPhos  119  23 @ 21:50          Physical Exam:  General: NAD  Heart: all extremities well perfused  Lungs: breathing comfortably  Abdomen: soft, non-tender, non-distended, fundus firm  Vaginal: lochia wnl  Extremities: No calf tenderness or erythema                     OB Progress Note:  PPD#1    S: 29yo PPD#1 s/p . Patient feels well. Pain is well controlled. She is tolerating a regular diet and passing flatus. She is voiding spontaneously, and ambulating without difficulty. Endorses light vaginal bleeding, soaking less than 1 pad/hour. Denies CP/SOB. Denies lightheadedness/dizziness. Denies N/V.     O:  Vitals:  Vital Signs Last 24 Hrs  T(C): 36.7 (14 Dec 2023 01:06), Max: 37.6 (13 Dec 2023 14:19)  T(F): 98 (14 Dec 2023 01:06), Max: 99.7 (13 Dec 2023 14:19)  HR: 88 (14 Dec 2023 01:06) (76 - 88)  BP: 136/75 (14 Dec 2023 01:06) (120/67 - 136/75)  BP(mean): --  RR: 18 (14 Dec 2023 01:06) (17 - 18)  SpO2: 99% (14 Dec 2023 01:06) (97% - 100%)    Parameters below as of 14 Dec 2023 01:06  Patient On (Oxygen Delivery Method): room air        MEDICATIONS  (STANDING):  acetaminophen     Tablet .. 975 milliGRAM(s) Oral <User Schedule>  ibuprofen  Tablet. 600 milliGRAM(s) Oral every 6 hours  prenatal multivitamin 1 Tablet(s) Oral daily  sodium chloride 0.9% lock flush 3 milliLiter(s) IV Push every 8 hours      Labs:  Blood type: O Positive  Rubella IgG: RPR: Negative                          11.7   12.99<H> >-----------< 142<L>    (  @ 21:50 )             35.0    23 @ 21:50      139  |  104  |  10  ----------------------------<  85  3.8   |  24  |  0.35<L>        Ca    9.6      12 Dec 2023 21:50    TPro  6.6  /  Alb  3.5  /  TBili  0.3  /  DBili  x   /  AST  16  /  ALT  14  /  AlkPhos  119  23 @ 21:50          Physical Exam:  General: NAD  Heart: all extremities well perfused  Lungs: breathing comfortably  Abdomen: soft, non-tender, non-distended, fundus firm  Vaginal: lochia wnl  Extremities: No calf tenderness or erythema                     OB Progress Note:  PPD#1    S: 29yo PPD#1 s/p . Patient feels well. Pain is well controlled with medication, improved from yesterday. She is tolerating a regular diet and passing flatus. She is voiding spontaneously, and ambulating without difficulty. Endorses light vaginal bleeding, soaking less than 1 pad/hour. Denies CP/SOB. Denies lightheadedness/dizziness. Denies N/V.     O:  Vitals:  Vital Signs Last 24 Hrs  T(C): 36.7 (14 Dec 2023 01:06), Max: 37.6 (13 Dec 2023 14:19)  T(F): 98 (14 Dec 2023 01:06), Max: 99.7 (13 Dec 2023 14:19)  HR: 88 (14 Dec 2023 01:06) (76 - 88)  BP: 136/75 (14 Dec 2023 01:06) (120/67 - 136/75)  BP(mean): --  RR: 18 (14 Dec 2023 01:06) (17 - 18)  SpO2: 99% (14 Dec 2023 01:06) (97% - 100%)    Parameters below as of 14 Dec 2023 01:06  Patient On (Oxygen Delivery Method): room air        MEDICATIONS  (STANDING):  acetaminophen     Tablet .. 975 milliGRAM(s) Oral <User Schedule>  ibuprofen  Tablet. 600 milliGRAM(s) Oral every 6 hours  prenatal multivitamin 1 Tablet(s) Oral daily  sodium chloride 0.9% lock flush 3 milliLiter(s) IV Push every 8 hours      Labs:  Blood type: O Positive  Rubella IgG: RPR: Negative                          11.7   12.99<H> >-----------< 142<L>    (  @ 21:50 )             35.0    23 @ 21:50      139  |  104  |  10  ----------------------------<  85  3.8   |  24  |  0.35<L>        Ca    9.6      12 Dec 2023 21:50    TPro  6.6  /  Alb  3.5  /  TBili  0.3  /  DBili  x   /  AST  16  /  ALT  14  /  AlkPhos  119  23 @ 21:50          Physical Exam:  General: NAD  Heart: all extremities well perfused  Lungs: breathing comfortably  Abdomen: soft, non-tender, non-distended, fundus firm  Vaginal: lochia wnl  Extremities: No calf tenderness or erythema                     OB Progress Note:  PPD#1    S: 27yo PPD#1 s/p . Patient feels well. Pain is well controlled with medication, improved from yesterday. She is tolerating a regular diet and passing flatus. She is voiding spontaneously, and ambulating without difficulty. Endorses light vaginal bleeding, soaking less than 1 pad/hour. Denies CP/SOB. Denies lightheadedness/dizziness. Denies N/V.     O:  Vitals:  Vital Signs Last 24 Hrs  T(C): 36.7 (14 Dec 2023 01:06), Max: 37.6 (13 Dec 2023 14:19)  T(F): 98 (14 Dec 2023 01:06), Max: 99.7 (13 Dec 2023 14:19)  HR: 88 (14 Dec 2023 01:06) (76 - 88)  BP: 136/75 (14 Dec 2023 01:06) (120/67 - 136/75)  BP(mean): --  RR: 18 (14 Dec 2023 01:06) (17 - 18)  SpO2: 99% (14 Dec 2023 01:06) (97% - 100%)    Parameters below as of 14 Dec 2023 01:06  Patient On (Oxygen Delivery Method): room air        MEDICATIONS  (STANDING):  acetaminophen     Tablet .. 975 milliGRAM(s) Oral <User Schedule>  ibuprofen  Tablet. 600 milliGRAM(s) Oral every 6 hours  prenatal multivitamin 1 Tablet(s) Oral daily  sodium chloride 0.9% lock flush 3 milliLiter(s) IV Push every 8 hours      Labs:  Blood type: O Positive  Rubella IgG: RPR: Negative                          11.7   12.99<H> >-----------< 142<L>    (  @ 21:50 )             35.0    23 @ 21:50      139  |  104  |  10  ----------------------------<  85  3.8   |  24  |  0.35<L>        Ca    9.6      12 Dec 2023 21:50    TPro  6.6  /  Alb  3.5  /  TBili  0.3  /  DBili  x   /  AST  16  /  ALT  14  /  AlkPhos  119  23 @ 21:50          Physical Exam:  General: NAD  Heart: all extremities well perfused  Lungs: breathing comfortably  Abdomen: soft, non-tender, non-distended, fundus firm  Vaginal: lochia wnl  Extremities: No calf tenderness or erythema

## 2023-12-14 NOTE — LACTATION INITIAL EVALUATION - INTERVENTION OUTCOME
Assisted with deep latch and positioning in cross cradle position. Infant was unswaddled and placed skin to skin , latched on the right breast with a wide gape and had a RS. Mother was encouraged to stimulate infant while at the breast with breast compression or massage to help keep the infant awake at the breast. Patient was made aware of cluster feeding that occurs after 24h of life and to be cautious of sleep deprivation. In order to maintain infant and patient safety, patient was instructed to place infant in bassinet or call for assistance as needed. Guide to postpartum and  care book was reviewed. Patient was educated on the nutritional needs of the baby and how many wet and dirty diapers to expect. Recognition of feeding cues and to feed the baby on demand, based on cues,  and at least 8-12 times in a day was discussed. Instructed patient to wake the baby to feed if no feeding cues are seen within 3h since prior feed.  Use of feeding log to record feedings along with wet and dirty diapers was encouraged. Instructed in hand expression with good return demonstration.  Reviewed safe skin to skin. Patient verbalized understanding of  information and education given. All questions and concerns were answered./verbalizes understanding/needs met

## 2023-12-14 NOTE — DISCHARGE NOTE OB - NS MD DC FALL RISK RISK
For information on Fall & Injury Prevention, visit: [email or web address].[email or web address]/news/fall-[email or web address]-and-maintains-health-and-mobility OR  [email or web address]://www.[facility]/news/fall-prevention-tips-to-avoid-injury OR  https://www.cdc.gov/steadi/patient.html For information on Fall & Injury Prevention, visit: [email or web address]://www.[email or web address]/news/fall-prevention-protects-and-maintains-health-and-mobility OR  https://www.[facility]/news/fall-prevention-tips-to-avoid-injury OR  https://www.cdc.gov/steadi/patient.html

## 2023-12-14 NOTE — DISCHARGE NOTE OB - CARE PROVIDER_API CALL
Erica Choi  Obstetrics and Gynecology  [address], [address]  [place] [address]  Phone: [phone]  Fax: [phone]  Established Patient  Follow Up Time: 1 week   Erica Choi  Obstetrics and Gynecology  [address], [address]  [place] [address]  Phone: [phone]  Fax: [phone]  Established Patient  Follow Up Time: 1 week

## 2023-12-14 NOTE — CHART NOTE - NSCHARTNOTEFT_GEN_A_CORE
[facility]  offered, patient refused.     Patient reporting intermittent pain in her legs, denies swelling or acute onset. Reports nothing specific brings on or relieves the pain in her legs. Reports constant pain in her back at site of epidural, improved with application of hot pack. Reports intermittent pain over her lower abdomen at the site of her uterus described as cramping. Reports this pain is worse with breastfeeding. Reports Motrin and Tylenol help somewhat, but patient still reporting moderate to severe pain intermittently.     Vital Signs Last 24 Hrs  T(C): 36.9 (14 Dec 2023 10:00), Max: 37.6 (13 Dec 2023 14:19)  T(F): 98.4 (14 Dec 2023 10:00), Max: 99.7 (13 Dec 2023 14:19)  HR: 80 (14 Dec 2023 10:00) (76 - 88)  BP: 125/78 (14 Dec 2023 10:00) (119/76 - 136/75)  BP(mean): --  RR: 18 (14 Dec 2023 10:00) (17 - 18)  SpO2: 100% (14 Dec 2023 10:00) (97% - 100%)    Parameters below as of 14 Dec 2023 10:00  Patient On (Oxygen Delivery Method): room air    PE:  - Extremities: LE non edematous, non tender to palpation, no erythema  - GYN: lochia wnl, fundus firm, non-tender to palpation  - Back: point tenderness at epidural site    A&P: 27y/o  PPD#1 from  c/b PEC without SF, in stable condition. Patient doing well.     - patient with continued multifocal pain on PPD1  - Will start Toradol instead of Motrin for the remainder of the day and monitor for improvement in symptoms   - Encouraged stretching, hot showers, warm compresses over back and uterus.   - Leg pain likely musculoskeletal in nature 2/2 positioning during delivery  - Back pain 2/2 epidural insertion, continue using hot pack at site as this provides symptom improvement  - Abdominal cramping consistent with postpartum uterine contraction. Patient did not breastfeed with her prior deliveries and did not experience these contraction pains which she is experiencing with breastfeeding now. Explained the pain is due to hormone production when breastfeeding, and patient reported understanding.     Carlee Mckeon, PGY1  d/w Dr. Canela [facility]  offered, patient refused.     Patient reporting intermittent pain in her legs, denies swelling or acute onset. Reports nothing specific brings on or relieves the pain in her legs. Reports constant pain in her back at site of epidural, improved with application of hot pack. Reports intermittent pain over her lower abdomen at the site of her uterus described as cramping. Reports this pain is worse with breastfeeding. Reports Motrin and Tylenol help somewhat, but patient still reporting moderate to severe pain intermittently.     Vital Signs Last 24 Hrs  T(C): 36.9 (14 Dec 2023 10:00), Max: 37.6 (13 Dec 2023 14:19)  T(F): 98.4 (14 Dec 2023 10:00), Max: 99.7 (13 Dec 2023 14:19)  HR: 80 (14 Dec 2023 10:00) (76 - 88)  BP: 125/78 (14 Dec 2023 10:00) (119/76 - 136/75)  BP(mean): --  RR: 18 (14 Dec 2023 10:00) (17 - 18)  SpO2: 100% (14 Dec 2023 10:00) (97% - 100%)    Parameters below as of 14 Dec 2023 10:00  Patient On (Oxygen Delivery Method): room air    PE:  - Extremities: LE non edematous, non tender to palpation, no erythema  - GYN: lochia wnl, fundus firm, non-tender to palpation  - Back: point tenderness at epidural site    A&P: 29y/o  PPD#1 from  c/b PEC without SF, in stable condition. Patient doing well.     - patient with continued multifocal pain on PPD1  - Will start Toradol instead of Motrin for the remainder of the day and monitor for improvement in symptoms   - Encouraged stretching, hot showers, warm compresses over back and uterus.   - Leg pain likely musculoskeletal in nature 2/2 positioning during delivery  - Back pain 2/2 epidural insertion, continue using hot pack at site as this provides symptom improvement  - Abdominal cramping consistent with postpartum uterine contraction. Patient did not breastfeed with her prior deliveries and did not experience these contraction pains which she is experiencing with breastfeeding now. Explained the pain is due to hormone production when breastfeeding, and patient reported understanding.     Carlee Mckeon, PGY1  d/w Dr. Canela

## 2023-12-14 NOTE — PROGRESS NOTE ADULT - ATTENDING COMMENTS
Associate Chief of L & D (Late entry)     I have met this patient for the first time today.  She received her care at [facility] and was admitted and delivered by Dr Alcides MESSINA Progress Note:  PPD#1    S: 27yo  PPD#1 s/p . Patient denies CP/SOB. Denies lightheadedness/dizziness. Denies N/V.    O:  Vitals:  Vital Signs Last 24 Hrs  T(C): 36.9 (14 Dec 2023 10:00), Max: 37.6 (13 Dec 2023 14:19)  T(F): 98.4 (14 Dec 2023 10:00), Max: 99.7 (13 Dec 2023 14:19)  HR: 80 (14 Dec 2023 10:00) (76 - 88)  BP: 125/78 (14 Dec 2023 10:00) (119/76 - 136/75)  RR: 18 (14 Dec 2023 10:00) (17 - 18)  SpO2: 100% (14 Dec 2023 10:00) (97% - 100%)    Parameters below as of 14 Dec 2023 10:00  Patient On (Oxygen Delivery Method): room air        MEDICATIONS  (STANDING):  acetaminophen     Tablet .. 975 milliGRAM(s) Oral <User Schedule>  ibuprofen  Tablet. 600 milliGRAM(s) Oral every 6 hours  prenatal multivitamin 1 Tablet(s) Oral daily  sodium chloride 0.9% lock flush 3 milliLiter(s) IV Push every 8 hours      Labs:  Blood type: O Positive  Rubella IgG: RPR: Negative                          10.7<L>   -- >-----------< --    (  @ 06:23 )             32.1<L>                        11.7   12.99<H> >-----------< 142<L>    (  @ 21:50 )             35.0    23 @ 21:50      139  |  104  |  10  ----------------------------<  85  3.8   |  24  |  0.35<L>        Ca    9.6      12 Dec 2023 21:50    TPro  6.6  /  Alb  3.5  /  TBili  0.3  /  DBili  x   /  AST  16  /  ALT  14  /  AlkPhos  119  23 @ 21:50      Physical Exam:  Abdomen: soft, non-tender, non-distended, fundus firm  Vaginal: Lochia scant   Extremities: No erythema/trace edema    A/P: 27yo PPD#1 s/p  and repair of 1st degree laceration complicated by PEC without severe features     - Pain well controlled, continue current pain regimen  - Increase ambulation, SCDs when not ambulating  - Continue regular diet  - Monitor BP's  Bushra Lozada M.D., M.B.A., M.S. Associate Chief of L & D (Late entry)     I have met this patient for the first time today.  She received her care at [facility] and was admitted and delivered by Dr Alcides MESSINA Progress Note:  PPD#1    S: 29yo  PPD#1 s/p . Patient denies CP/SOB. Denies lightheadedness/dizziness. Denies N/V.    O:  Vitals:  Vital Signs Last 24 Hrs  T(C): 36.9 (14 Dec 2023 10:00), Max: 37.6 (13 Dec 2023 14:19)  T(F): 98.4 (14 Dec 2023 10:00), Max: 99.7 (13 Dec 2023 14:19)  HR: 80 (14 Dec 2023 10:00) (76 - 88)  BP: 125/78 (14 Dec 2023 10:00) (119/76 - 136/75)  RR: 18 (14 Dec 2023 10:00) (17 - 18)  SpO2: 100% (14 Dec 2023 10:00) (97% - 100%)    Parameters below as of 14 Dec 2023 10:00  Patient On (Oxygen Delivery Method): room air        MEDICATIONS  (STANDING):  acetaminophen     Tablet .. 975 milliGRAM(s) Oral <User Schedule>  ibuprofen  Tablet. 600 milliGRAM(s) Oral every 6 hours  prenatal multivitamin 1 Tablet(s) Oral daily  sodium chloride 0.9% lock flush 3 milliLiter(s) IV Push every 8 hours      Labs:  Blood type: O Positive  Rubella IgG: RPR: Negative                          10.7<L>   -- >-----------< --    (  @ 06:23 )             32.1<L>                        11.7   12.99<H> >-----------< 142<L>    (  @ 21:50 )             35.0    23 @ 21:50      139  |  104  |  10  ----------------------------<  85  3.8   |  24  |  0.35<L>        Ca    9.6      12 Dec 2023 21:50    TPro  6.6  /  Alb  3.5  /  TBili  0.3  /  DBili  x   /  AST  16  /  ALT  14  /  AlkPhos  119  23 @ 21:50      Physical Exam:  Abdomen: soft, non-tender, non-distended, fundus firm  Vaginal: Lochia scant   Extremities: No erythema/trace edema    A/P: 29yo PPD#1 s/p  and repair of 1st degree laceration complicated by PEC without severe features     - Pain well controlled, continue current pain regimen  - Increase ambulation, SCDs when not ambulating  - Continue regular diet  - Monitor BP's  Bushra Lozada M.D., M.B.A., M.S.

## 2023-12-14 NOTE — PROGRESS NOTE ADULT - ASSESSMENT
27y/o  PPD#1 from  in stable condition. Patient doing well.      29y/o  PPD#1 from  c/b PEC without SF, in stable condition. Patient doing well.

## 2023-12-14 NOTE — PROGRESS NOTE ADULT - PROBLEM SELECTOR PLAN 1
-Continue with PO analgesia  -OOB, increase ambulation   -Continue regular diet    -QBL: 250mL, Hct: 35.0  -F/u AM CBC -Continue with PO analgesia  -OOB, increase ambulation   -Continue regular diet    -QBL: 250mL, Hct: 35.0->32.1  -Patient desired discharge today. Will follow up with Dr. Choi

## 2023-12-14 NOTE — DISCHARGE NOTE OB - CARE PLAN
1 Principal Discharge DX:	Vaginal delivery  Assessment and plan of treatment:	Make your follow-up appointment with your doctor as ordered.   No heavy lifting, driving, or strenuous activity for 6 weeks.  Nothing per vagina such as tampons, intercourse, douches or tub baths for 6 weeks or until you see your doctor.  Call your doctor if you're unable to tolerate food, increase in vaginal bleeding, or have difficulty urinating. Call your doctor if you have pain that is not relieved by your prescribed medications. Notify your doctor with any other concerns.    Prescription sent to your pharmacy for a blood pressure cuff to monitor your blood pressures at home. Call your doctor if your blood pressure is greater than or equal to 160 systolic (top number) of 110 diastolic (bottom number) or if you experience a headache unrelieved by OTC medications, blurred vision, or difficulty breathing.    Please f/u within 1 week in Dr. Choi's office for a blood pressure check, and in 4-6 weeks @Dr. Choi's office Please call the office for an appointment [phone]  Secondary Diagnosis:	Preeclampsia  Assessment and plan of treatment:	Make your follow-up appointment with your doctor as ordered.   No heavy lifting, driving, or strenuous activity for 6 weeks.  Nothing per vagina such as tampons, intercourse, douches or tub baths for 6 weeks or until you see your doctor.  Call your doctor if you're unable to tolerate food, increase in vaginal bleeding, or have difficulty urinating. Call your doctor if you have pain that is not relieved by your prescribed medications. Notify your doctor with any other concerns.    Prescription sent to your pharmacy for a blood pressure cuff to monitor your blood pressures at home. Call your doctor if your blood pressure is greater than or equal to 160 systolic (top number) of 110 diastolic (bottom number) or if you experience a headache unrelieved by OTC medications, blurred vision, or difficulty breathing.    Please f/u within 1 week in Dr. Choi's office for a blood pressure check, and in 4-6 weeks @Dr. Choi's office Please call the office for an appointment [phone]   Principal Discharge DX:	Vaginal delivery  Assessment and plan of treatment:	Make your follow-up appointment with your doctor as ordered.   No heavy lifting, driving, or strenuous activity for 6 weeks.  Nothing per vagina such as tampons, intercourse, douches or tub baths for 6 weeks or until you see your doctor.  Call your doctor if you're unable to tolerate food, increase in vaginal bleeding, or have difficulty urinating. Call your doctor if you have pain that is not relieved by your prescribed medications. Notify your doctor with any other concerns.    Prescription sent to your pharmacy for a blood pressure cuff to monitor your blood pressures at home. Call your doctor if your blood pressure is greater than or equal to 160 systolic (top number) of 110 diastolic (bottom number) or if you experience a headache unrelieved by OTC medications, blurred vision, or difficulty breathing.    Please f/u within 1 week in Dr. Choi's office for a blood pressure check, and in 4-6 weeks @Dr. Choi's office Please call the office for an appointment [phone]  Secondary Diagnosis:	Preeclampsia  Assessment and plan of treatment:	Make your follow-up appointment with your doctor as ordered.   No heavy lifting, driving, or strenuous activity for 6 weeks.  Nothing per vagina such as tampons, intercourse, douches or tub baths for 6 weeks or until you see your doctor.  Call your doctor if you're unable to tolerate food, increase in vaginal bleeding, or have difficulty urinating. Call your doctor if you have pain that is not relieved by your prescribed medications. Notify your doctor with any other concerns.    Prescription sent to your pharmacy for a blood pressure cuff to monitor your blood pressures at home. Call your doctor if your blood pressure is greater than or equal to 160 systolic (top number) of 110 diastolic (bottom number) or if you experience a headache unrelieved by OTC medications, blurred vision, or difficulty breathing.    Please f/u within 1 week in Dr. Choi's office for a blood pressure check, and in 4-6 weeks @Dr. Choi's office Please call the office for an appointment [phone]

## 2023-12-14 NOTE — PROGRESS NOTE ADULT - PROBLEM SELECTOR PLAN 2
- met criteria with mild-range BP and P/C 0.5  - BP wnl overnight  - HELLP wnl  - denies severe features   - no antihypertensive medications

## 2023-12-14 NOTE — DISCHARGE NOTE OB - PLAN OF CARE
Make your follow-up appointment with your doctor as ordered.   No heavy lifting, driving, or strenuous activity for 6 weeks.  Nothing per vagina such as tampons, intercourse, douches or tub baths for 6 weeks or until you see your doctor.  Call your doctor if you're unable to tolerate food, increase in vaginal bleeding, or have difficulty urinating. Call your doctor if you have pain that is not relieved by your prescribed medications. Notify your doctor with any other concerns.    Prescription sent to your pharmacy for a blood pressure cuff to monitor your blood pressures at home. Call your doctor if your blood pressure is greater than or equal to 160 systolic (top number) of 110 diastolic (bottom number) or if you experience a headache unrelieved by OTC medications, blurred vision, or difficulty breathing.    Please f/u within 1 week in Dr. Choi's office for a blood pressure check, and in 4-6 weeks @Dr. Choi's office Please call the office for an appointment [phone] Make your follow-up appointment with your doctor as ordered.   No heavy lifting, driving, or strenuous activity for 6 weeks.  Nothing per vagina such as tampons, intercourse, douches or tub baths for 6 weeks or until you see your doctor.  Call your doctor if you're unable to tolerate food, increase in vaginal bleeding, or have difficulty urinating. Call your doctor if you have pain that is not relieved by your prescribed medications. Notify your doctor with any other concerns.    Prescription sent to your pharmacy for a blood pressure cuff to monitor your blood pressures at home. Call your doctor if your blood pressure is greater than or equal to 160 systolic (top number) of 110 diastolic (bottom number) or if you experience a headache unrelieved by OTC medications, blurred vision, or difficulty breathing.    Please f/u within 1 week in Dr. Choi's office for a blood pressure check, and in 4-6 weeks @Dr. Choi's office Please call the office for an appointment [phone] Make your follow-up appointment with your doctor as ordered.   No heavy lifting, driving, or strenuous activity for 6 weeks.  Nothing per vagina such as tampons, intercourse, douches or tub baths for 6 weeks or until you see your doctor.  Call your doctor if you're unable to tolerate food, increase in vaginal bleeding, or have difficulty urinating. Call your doctor if you have pain that is not relieved by your prescribed medications. Notify your doctor with any other concerns.    Prescription sent to your pharmacy for a blood pressure cuff to monitor your blood pressures at home. Call your doctor if your blood pressure is greater than or equal to 160 systolic (top number) of 110 diastolic (bottom number) or if you experience a headache unrelieved by OTC medications, blurred vision, or difficulty breathing.    Please f/u within 1 week in Dr. Choi's office for a blood pressure check, and in 4-6 weeks @Dr. Choi's office Please call the office for an appointment [phone] Make your follow-up appointment with your doctor as ordered.   No heavy lifting, driving, or strenuous activity for 6 weeks.  Nothing per vagina such as tampons, intercourse, douches or tub baths for 6 weeks or until you see your doctor.  Call your doctor if you're unable to tolerate food, increase in vaginal bleeding, or have difficulty urinating. Call your doctor if you have pain that is not relieved by your prescribed medications. Notify your doctor with any other concerns.    Prescription sent to your pharmacy for a blood pressure cuff to monitor your blood pressures at home. Call your doctor if your blood pressure is greater than or equal to 160 systolic (top number) of 110 diastolic (bottom number) or if you experience a headache unrelieved by OTC medications, blurred vision, or difficulty breathing.    Please f/u within 1 week in Dr. Choi's office for a blood pressure check, and in 4-6 weeks @Dr. Choi's office Please call the office for an appointment [phone]

## 2023-12-14 NOTE — DISCHARGE NOTE OB - ADDITIONAL INSTRUCTIONS
Make your follow-up appointment with your doctor as ordered.   No heavy lifting, driving, or strenuous activity for 6 weeks.  Nothing per vagina such as tampons, intercourse, douches or tub baths for 6 weeks or until you see your doctor.  Call your doctor if you're unable to tolerate food, increase in vaginal bleeding, or have difficulty urinating. Call your doctor if you have pain that is not relieved by your prescribed medications. Notify your doctor with any other concerns.    Prescription sent to your pharmacy for a blood pressure cuff to monitor your blood pressures at home. Call your doctor if your blood pressure is greater than or equal to 160 systolic (top number) of 110 diastolic (bottom number) or if you experience a headache unrelieved by OTC medications, blurred vision, or difficulty breathing.    Please f/u within 1 week in Dr. Choi's office for a blood pressure check, and in 4-6 weeks @Dr. Choi's office Please call the office for an appointment [phone] Make your follow-up appointment with your doctor as ordered.   No heavy lifting, driving, or strenuous activity for 6 weeks.  Nothing per vagina such as tampons, intercourse, douches or tub baths for 6 weeks or until you see your doctor.  Call your doctor if you're unable to tolerate food, increase in vaginal bleeding, or have difficulty urinating. Call your doctor if you have pain that is not relieved by your prescribed medications. Notify your doctor with any other concerns.    Prescription sent to your pharmacy for a blood pressure cuff to monitor your blood pressures at home. Call your doctor if your blood pressure is greater than or equal to 160 systolic (top number) of 110 diastolic (bottom number) or if you experience a headache unrelieved by OTC medications, blurred vision, or difficulty breathing.    Please f/u within 1 week in Dr. Choi's office for a blood pressure check, and in 4-6 weeks @Dr. Choi's office Please call the office for an appointment [phone]

## 2023-12-14 NOTE — LACTATION INITIAL EVALUATION - LACTATION INTERVENTIONS
initiate/review safe skin-to-skin/initiate/review hand expression/initiate/review techniques for position and latch/review techniques to manage sore nipples/engorgement/initiate/review breast massage/compression/reviewed components of an effective feeding and at least 8 effective feedings per day required/reviewed importance of monitoring infant diapers, the breastfeeding log, and minimum output each day/reviewed benefits and recommendations for rooming in/reviewed feeding on demand/by cue at least 8 times a day

## 2023-12-14 NOTE — DISCHARGE NOTE OB - MATERIALS PROVIDED
[facility] [place] Screening Program/[place]  Immunization Record/Breastfeeding Log/Breastfeeding Mother’s Support Group Information/Guide to Postpartum Care/[facility] Hearing Screen Program/Back To Sleep Handout/Shaken Baby Prevention Handout/Breastfeeding Guide and Packet/Birth Certificate Instructions/Discharge Medication Information for Patients and Families Pocket Guide [facility] [place] Screening Program/[place]  Immunization Record/Breastfeeding Log/Breastfeeding Mother’s Support Group Information/Guide to Postpartum Care/[facility] Hearing Screen Program/Back To Sleep Handout/Shaken Baby Prevention Handout/Breastfeeding Guide and Packet/Birth Certificate Instructions/Discharge Medication Information for Patients and Families Pocket Guide

## 2023-12-14 NOTE — DISCHARGE NOTE OB - FINDINGS/TREATMENT
Make your follow-up appointment with your doctor as ordered.   No heavy lifting, driving, or strenuous activity for 6 weeks.  Nothing per vagina such as tampons, intercourse, douches or tub baths for 6 weeks or until you see your doctor.  Call your doctor if you're unable to tolerate food, increase in vaginal bleeding, or have difficulty urinating. Call your doctor if you have pain that is not relieved by your prescribed medications. Notify your doctor with any other concerns.    Prescription sent to your pharmacy for a blood pressure cuff to monitor your blood pressures at home. Call your doctor if your blood pressure is greater than or equal to 160 systolic (top number) of 110 diastolic (bottom number) or if you experience a headache unrelieved by OTC medications, blurred vision, or difficulty breathing.    Please f/u within 1 week in Dr. Choi's office for a blood pressure check, and in 4-6 weeks @Dr. Choi's office Please call the office for an appointment [phone] Make your follow-up appointment with your doctor as ordered.   No heavy lifting, driving, or strenuous activity for 6 weeks.  Nothing per vagina such as tampons, intercourse, douches or tub baths for 6 weeks or until you see your doctor.  Call your doctor if you're unable to tolerate food, increase in vaginal bleeding, or have difficulty urinating. Call your doctor if you have pain that is not relieved by your prescribed medications. Notify your doctor with any other concerns.    Prescription sent to your pharmacy for a blood pressure cuff to monitor your blood pressures at home. Call your doctor if your blood pressure is greater than or equal to 160 systolic (top number) of 110 diastolic (bottom number) or if you experience a headache unrelieved by OTC medications, blurred vision, or difficulty breathing.    Please f/u within 1 week in Dr. Choi's office for a blood pressure check, and in 4-6 weeks @Dr. Choi's office Please call the office for an appointment [phone]

## 2023-12-14 NOTE — DISCHARGE NOTE OB - MEDICATION SUMMARY - MEDICATIONS TO TAKE
I will START or STAY ON the medications listed below when I get home from the hospital:    Electronic Blood Pressure Cuff  -- Take blood pressure twice daily, once in the morning and once in the evening  -- Indication: For Preeclampsia    prenatal vitamins  -- Indication: For nutrition    ibuprofen 600 mg oral tablet  -- 1 tab(s) by mouth every 6 hours as needed for  mild pain  -- Indication: For Pain    acetaminophen 325 mg oral tablet  -- 3 tab(s) by mouth every 6 hours as needed for  mild pain  -- Indication: For Pain    lanolin topical ointment  -- 1 Apply on skin to affected area every 6 hours As needed nipple soreness  -- Indication: For breastfeeding    Prenatal Multivitamins with Folic Acid 1 mg oral tablet  -- 1 tab(s) by mouth once a day  -- Indication: For nutrition

## 2023-12-14 NOTE — DISCHARGE NOTE OB - PATIENT PORTAL LINK FT
You can access the FollowMyHealth Patient Portal offered by [facility] by registering at the following website: [email or web address]. By joining [facility]’s FollowMyHealth portal, you will also be able to view your health information using other applications (apps) compatible with our system. You can access the FollowMyHealth Patient Portal offered by [facility] by registering at the following website: [email or web address]. By joining [facility]’s FollowMyHealth portal, you will also be able to view your health information using other applications (apps) compatible with our system.

## 2023-12-14 NOTE — LACTATION INITIAL EVALUATION - MILK SUPPLY
PAST SURGICAL HISTORY:  Acute subdural hematoma     History of knee replacement, total, right     Other postprocedural status S/P atrial septal defect closure, surgical    Status post tubal ligation S/P tubal ligation     colostrum

## 2023-12-15 VITALS
RESPIRATION RATE: 18 BRPM | SYSTOLIC BLOOD PRESSURE: 127 MMHG | DIASTOLIC BLOOD PRESSURE: 75 MMHG | HEART RATE: 86 BPM | OXYGEN SATURATION: 100 % | TEMPERATURE: 98 F

## 2023-12-15 RX ADMIN — Medication 975 MILLIGRAM(S): at 15:41

## 2023-12-15 RX ADMIN — Medication 975 MILLIGRAM(S): at 16:33

## 2023-12-15 RX ADMIN — Medication 975 MILLIGRAM(S): at 09:00

## 2023-12-15 RX ADMIN — Medication 975 MILLIGRAM(S): at 08:06

## 2023-12-15 RX ADMIN — Medication 1 TABLET(S): at 12:11

## 2023-12-15 RX ADMIN — SODIUM CHLORIDE 3 MILLILITER(S): 9 INJECTION INTRAMUSCULAR; INTRAVENOUS; SUBCUTANEOUS at 06:17

## 2023-12-15 NOTE — PROGRESS NOTE ADULT - ATTENDING COMMENTS
Associate Chief of L & D (Late entry)    OB Progress Note:  PPD#2    S: 27yo  PPD#2 s/p . Patient denies CP/SOB. Denies lightheadedness/dizziness. Denies N/V.    O:  Vital Signs Last 24 Hrs  T(C): 37.2 (15 Dec 2023 06:22), Max: 37.2 (14 Dec 2023 14:00)  T(F): 99 (15 Dec 2023 06:22), Max: 99 (14 Dec 2023 14:00)  HR: 94 (15 Dec 2023 06:22) (67 - 95)  BP: 112/60 (15 Dec 2023 06:22) (112/60 - 134/82)  RR: 18 (15 Dec 2023 06:22) (16 - 18)  SpO2: 99% (15 Dec 2023 06:22) (98% - 100%)    Parameters below as of 15 Dec 2023 06:22  Patient On (Oxygen Delivery Method): room air            MEDICATIONS  (STANDING):  acetaminophen     Tablet .. 975 milliGRAM(s) Oral <User Schedule>  ibuprofen  Tablet. 600 milliGRAM(s) Oral every 6 hours  prenatal multivitamin 1 Tablet(s) Oral daily  sodium chloride 0.9% lock flush 3 milliLiter(s) IV Push every 8 hours      Labs:  Blood type: O Positive  Rubella IgG: RPR: Negative                          10.7<L>   -- >-----------< --    (  @ 06:23 )             32.1<L>                        11.7   12.99<H> >-----------< 142<L>    (  @ 21:50 )             35.0    - @ 21:50      139  |  104  |  10  ----------------------------<  85  3.8   |  24  |  0.35<L>        Physical Exam:  Abdomen: soft, non-tender, non-distended, fundus firm  Vaginal: Lochia scant   Extremities: No erythema/trace edema    A/P: 27yo PPD2  s/p  and repair of 1st degree laceration complicated by PEC without severe features     - Patient is stable for discharge and will follow up with Dr Alexander  - Monitor BP's  Bushra De Dawood Murray M.D., M.B.A., M.S.

## 2023-12-15 NOTE — PROGRESS NOTE ADULT - SUBJECTIVE AND OBJECTIVE BOX
OB Progress Note:  PPD#2    S: 29yo PPD#2 s/p . Patient feels well. Continues to report intermittent leg pain, pain associated with uterine contractions described as cramping and pain at the epidural site. Patient reports pain is improved with Toradol over the past 24 hours. She is tolerating a regular diet and passing flatus. She is voiding spontaneously, and ambulating without difficulty. Endorses light vaginal bleeding, soaking less than 1 pad/hour. Denies CP/SOB. Denies lightheadedness/dizziness. Denies N/V.     O:  Vitals:  Vital Signs Last 24 Hrs  T(C): 36.9 (14 Dec 2023 21:56), Max: 37.2 (14 Dec 2023 14:00)  T(F): 98.5 (14 Dec 2023 21:56), Max: 99 (14 Dec 2023 14:00)  HR: 67 (14 Dec 2023 21:56) (67 - 95)  BP: 134/82 (14 Dec 2023 21:56) (119/76 - 134/82)  BP(mean): --  RR: 16 (14 Dec 2023 21:56) (16 - 18)  SpO2: 98% (14 Dec 2023 21:56) (98% - 100%)    Parameters below as of 14 Dec 2023 21:56  Patient On (Oxygen Delivery Method): room air      MEDICATIONS  (STANDING):  acetaminophen     Tablet .. 975 milliGRAM(s) Oral <User Schedule>  ibuprofen  Tablet. 600 milliGRAM(s) Oral every 6 hours  prenatal multivitamin 1 Tablet(s) Oral daily  sodium chloride 0.9% lock flush 3 milliLiter(s) IV Push every 8 hours      Labs:  Blood type: O Positive  Rubella IgG: RPR: Negative                          11.7   12.99<H> >-----------< 142<L>    (  @ 21:50 )             35.0    23 @ 21:50      139  |  104  |  10  ----------------------------<  85  3.8   |  24  |  0.35<L>        Ca    9.6      12 Dec 2023 21:50    TPro  6.6  /  Alb  3.5  /  TBili  0.3  /  DBili  x   /  AST  16  /  ALT  14  /  AlkPhos  119  23 @ 21:50          Physical Exam:  General: NAD  Heart: all extremities well perfused  Lungs: breathing comfortably  Abdomen: soft, non-tender, non-distended, fundus firm  Vaginal: lochia wnl  Extremities: No calf tenderness or erythema                     OB Progress Note:  PPD#2    S: 29yo PPD#2 s/p . Patient feels well. Reports her leg pain is coming less often and is less painful and that medications are helping. Reports her uterine cramping is much better with medications. Reports the pain at epidural site is mostly resolved and now tolerable with medications. She is tolerating a regular diet and passing flatus. She is voiding spontaneously, and ambulating without difficulty. Endorses light vaginal bleeding, soaking less than 1 pad/hour. Denies CP/SOB. Denies lightheadedness/dizziness. Denies N/V.     O:  Vitals:  Vital Signs Last 24 Hrs  T(C): 36.9 (14 Dec 2023 21:56), Max: 37.2 (14 Dec 2023 14:00)  T(F): 98.5 (14 Dec 2023 21:56), Max: 99 (14 Dec 2023 14:00)  HR: 67 (14 Dec 2023 21:56) (67 - 95)  BP: 134/82 (14 Dec 2023 21:56) (119/76 - 134/82)  BP(mean): --  RR: 16 (14 Dec 2023 21:56) (16 - 18)  SpO2: 98% (14 Dec 2023 21:56) (98% - 100%)    Parameters below as of 14 Dec 2023 21:56  Patient On (Oxygen Delivery Method): room air      MEDICATIONS  (STANDING):  acetaminophen     Tablet .. 975 milliGRAM(s) Oral <User Schedule>  ibuprofen  Tablet. 600 milliGRAM(s) Oral every 6 hours  prenatal multivitamin 1 Tablet(s) Oral daily  sodium chloride 0.9% lock flush 3 milliLiter(s) IV Push every 8 hours      Labs:  Blood type: O Positive  Rubella IgG: RPR: Negative                          11.7   12.99<H> >-----------< 142<L>    (  @ 21:50 )             35.0    23 @ 21:50      139  |  104  |  10  ----------------------------<  85  3.8   |  24  |  0.35<L>        Ca    9.6      12 Dec 2023 21:50    TPro  6.6  /  Alb  3.5  /  TBili  0.3  /  DBili  x   /  AST  16  /  ALT  14  /  AlkPhos  119  23 @ 21:50          Physical Exam:  General: NAD  Heart: all extremities well perfused  Lungs: breathing comfortably  Abdomen: soft, non-tender, non-distended, fundus firm  Vaginal: lochia wnl  Extremities: No calf tenderness or erythema

## 2023-12-15 NOTE — PROGRESS NOTE ADULT - PROBLEM SELECTOR PLAN 2
- met criteria with mild-range BP and P/C 0.5  - BP wnl overnight  - HELLP wnl  - denies severe features   - no antihypertensive medications - met criteria with BP >140/90 >4h apart and P/C 0.5  - BP wnl overnight  - HELLP wnl  - denies severe features   - no antihypertensive medications  - Pt to follow up with primary OBGYN within a week for BP check

## 2023-12-15 NOTE — PROGRESS NOTE ADULT - PROBLEM SELECTOR PLAN 1
-Continue with PO analgesia  -OOB, increase ambulation   -Continue regular diet    -QBL: 250mL, Hct: 35.0->32.1  -Positional MSK pain in legs improved from yesterday  -Uterine cramping pain improved with medications, wnl postpartum   -Epidural site pain improved with warm pack application

## 2023-12-15 NOTE — PROGRESS NOTE ADULT - ASSESSMENT
29y/o  PPD#1 from  c/b PEC without SF, in stable condition. Patient doing well and pain improving. Patient stable for discharge today.   29y/o  PPD#2 from  c/b PEC without SF, in stable condition. Patient doing well and pain improving. Pain is now well controlled and patient stable for discharge today.

## 2023-12-16 ENCOUNTER — TRANSCRIPTION ENCOUNTER (OUTPATIENT)
Age: 28
End: 2023-12-16

## 2023-12-17 ENCOUNTER — NON-APPOINTMENT (OUTPATIENT)
Age: 28
End: 2023-12-17

## 2024-06-28 ENCOUNTER — APPOINTMENT (OUTPATIENT)
Dept: DERMATOLOGY | Facility: CLINIC | Age: 29
End: 2024-06-28
